# Patient Record
Sex: MALE | Race: WHITE
[De-identification: names, ages, dates, MRNs, and addresses within clinical notes are randomized per-mention and may not be internally consistent; named-entity substitution may affect disease eponyms.]

---

## 2018-05-15 ENCOUNTER — HOSPITAL ENCOUNTER (EMERGENCY)
Dept: HOSPITAL 50 - VM.ED | Age: 38
Discharge: HOME | End: 2018-05-15
Payer: COMMERCIAL

## 2018-05-15 VITALS — DIASTOLIC BLOOD PRESSURE: 88 MMHG | SYSTOLIC BLOOD PRESSURE: 147 MMHG

## 2018-05-15 DIAGNOSIS — N13.2: Primary | ICD-10-CM

## 2018-05-15 LAB
CHLORIDE SERPL-SCNC: 106 MMOL/L (ref 98–107)
SODIUM SERPL-SCNC: 144 MMOL/L (ref 136–145)

## 2018-05-15 NOTE — EDM.PDOC
ED HPI GENERAL MEDICAL PROBLEM





- General


Chief Complaint: Flank Pain


Stated Complaint: ER


Time Seen by Provider: 05/15/18 09:49


Source of Information: Reports: Patient


History Limitations: Reports: No Limitations





- History of Present Illness


INITIAL COMMENTS - FREE TEXT/NARRATIVE: 


Patient comes in this morning with complaints of left flank pain and nausea 

that started this morning and has progressively worsened.  He did have a 

similar episode last week, but the pain resolved after a bout of emesis.  He 

has no other complaints today.  He denies fever, chills, chest pain, diarrhea, 

blood in urine or stool.  He does have some left lateral abdominal pain, cva 

pain.  No medication allergies and is not currently taking any daily 

medications.


Onset: Today


Onset Date: 05/15/18


Location: Reports: Abdomen


Severity: Severe


Associated Symptoms: Reports: Diaphoresis, Nausea/Vomiting


  ** Left Flank


Pain Score (Numeric/FACES): 9





- Related Data


 Allergies











Allergy/AdvReac Type Severity Reaction Status Date / Time


 


No Known Allergies Allergy   Verified 05/15/18 09:48











Home Meds: 


 Home Meds





. [No Known Home Meds]  05/15/18 [History]











Past Medical History





- Past Health History


Medical/Surgical History: Denies Medical/Surgical History





ED ROS GENERAL





- Review of Systems


Review Of Systems: See Below


Constitutional: Reports: No Symptoms


HEENT: Reports: No Symptoms


Respiratory: Reports: No Symptoms


Cardiovascular: Reports: No Symptoms


Endocrine: Reports: No Symptoms


GI/Abdominal: Reports: Abdominal Pain, Nausea


: Reports: No Symptoms


Musculoskeletal: Reports: Back Pain


Skin: Reports: No Symptoms


Neurological: Reports: No Symptoms


Psychiatric: Reports: No Symptoms


Hematologic/Lymphatic: Reports: No Symptoms


Immunologic: Reports: No Symptoms





ED EXAM, RENAL/





- Physical Exam


Exam: See Below


Exam Limited By: No Limitations


General Appearance: Alert, WD/WN, Moderate Distress


Eye Exam: Bilateral Eye: EOMI, Normal Inspection, PERRL


Ears: Normal External Exam, Normal Canal, Hearing Grossly Normal, Normal TMs


Nose: Normal Inspection, Normal Mucosa, No Blood


Throat/Mouth: Normal Inspection, Normal Lips, Normal Teeth, Normal Gums, Normal 

Oropharynx, Normal Voice, No Airway Compromise


Head: Atraumatic, Normocephalic


Neck: Normal Inspection, Supple, Non-Tender, Full Range of Motion


Respiratory/Chest: No Respiratory Distress, Lungs Clear, Normal Breath Sounds, 

No Accessory Muscle Use, Chest Non-Tender


Cardiovascular: Normal Peripheral Pulses, Regular Rate, Rhythm, No Edema, No 

Gallop, No JVD, No Murmur, No Rub


GI/Abdominal: Normal Bowel Sounds, Soft, Non-Tender, No Organomegaly, No 

Distention, No Abnormal Bruit, No Mass


Back Exam: Normal Inspection, Full Range of Motion, CVA Tenderness (L)


Extremities: Normal Inspection, Normal Range of Motion, Non-Tender, Normal 

Capillary Refill, No Pedal Edema


Neurological: Alert, Oriented, CN II-XII Intact, Normal Cognition, Normal Gait, 

Normal Reflexes, No Motor/Sensory Deficits


Psychiatric: Normal Affect, Normal Mood


Skin Exam: Warm, Dry, Intact, Normal Color, No Rash


Lymphatic: No Adenopathy





Course





- Vital Signs


Last Recorded V/S: 


 Last Vital Signs











Temp  35.4 C   05/15/18 09:41


 


Pulse  70   05/15/18 10:50


 


Resp  16   05/15/18 10:50


 


BP  147/88 H  05/15/18 10:50


 


Pulse Ox  99   05/15/18 10:50














- Orders/Labs/Meds


Orders: 


 Active Orders 24 hr











 Category Date Time Status


 


 Abdomen Pelvis wo Cont [CT] Stat Exams  05/15/18 09:51 Ordered


 


 CULTURE URINE [RM] Stat Lab  05/15/18 09:50 Ordered


 


 UA W/MICROSCOPIC [URIN] Stat Lab  05/15/18 09:50 Ordered


 


 Saline Lock Insert [OM.PC] Routine Oth  05/15/18 09:50 Ordered











Labs: 


 Laboratory Tests











  05/15/18 05/15/18 05/15/18 Range/Units





  10:09 10:09 10:59 


 


WBC  10.7 H    (4.0-10.0)  x10^3/uL


 


RBC  5.12    (4.5-6.0)  x10^6/uL


 


Hgb  15.8    (14.0-18.0)  g/dL


 


Hct  43.8    (40.0-52.0)  %


 


MCV  85.5    (78.0-93.0)  fL


 


MCH  30.9    (26.0-32.0)  pg


 


MCHC  36.1 H    (32.0-36.0)  g/dL


 


RDW Coeff of Kaden  12.8    (10.0-15.0)  %


 


Plt Count  281    (130-400)  x10^3/uL


 


Neut % (Auto)  78.5    (50.0-80.0)  %


 


Lymph % (Auto)  15.4 L    (25.0-50.0)  %


 


Mono % (Auto)  4.9    (2.0-11.0)  %


 


Eos % (Auto)  1.1    (0.0-4.0)  %


 


Baso % (Auto)  0.1 L    (0.2-1.2)  %


 


Sodium   144   (136-145)  mmol/L


 


Potassium   3.7   (3.5-5.1)  mmol/L


 


Chloride   106   ()  mmol/L


 


Carbon Dioxide   23   (21-32)  mmol/L


 


Anion Gap   18.7   (10-20)  mmol/L


 


BUN   15   (7-18)  mg/dL


 


Creatinine   1.3   (0.70-1.30)  mg/dL


 


Est Cr Clr Drug Dosing   TNP   


 


Estimated GFR (MDRD)   > 60   


 


Glucose   159 H   ()  mg/dL


 


Calcium   9.2   (8.5-10.1)  mg/dL


 


Corrected Calcium   8.80   (8.5-10.1)  mg/dL


 


Total Bilirubin   1.0   (0.2-1.0)  mg/dL


 


AST   15   (15-37)  U/L


 


ALT   22   (16-63)  U/L


 


Alkaline Phosphatase   41 L   ()  U/L


 


Total Protein   7.6   (6.4-8.2)  g/dL


 


Albumin   4.5   (3.4-5.0)  g/dL


 


Globulin   3.1   


 


Albumin/Globulin Ratio   1.45   


 


Amylase   59   ()  U/L


 


Urine Color    Dark yellow H  (YELLOW)  


 


Urine Appearance    Turbid H  (CLEAR)  


 


Urine pH    7.0  (5.0-8.0)  


 


Ur Specific Gravity    1.020  


 


Urine Protein    30 H  (NEGATIVE)  mg/dL


 


Urine Glucose (UA)    Negative  (NEGATIVE)  mg/dL


 


Urine Ketones    40 H  (NEGATIVE)  mg/dL


 


Urine Occult Blood    Large H  (NEGATIVE)  


 


Urine Nitrite    Negative  (NEGATIVE)  


 


Urine Bilirubin    Negative  (NEGATIVE)  


 


Urine Urobilinogen    0.2  (0.2)  EU/dL


 


Ur Leukocyte Esterase    Negative  (NEGATIVE)  


 


Urine RBC    >100 H  (NOT SEEN)  /HPF


 


Urine Red Cell Clumps    Present  


 


Urine WBC    Not seen  (NOT SEEN)  /HPF


 


Ur Squamous Epith Cells    Not seen  (NEGATIVE)  /HPF


 


Amorphous Sediment    Moderate  


 


Urine Bacteria    Rare  (NEGATIVE)  /HPF


 


Hyaline Casts    Few H  (NEGATIVE)  /HPF


 


Granular Casts    Few H  (NEGATIVE)  /HPF


 


Urine Mucus    Many H  (NEGATIVE)  /LPF











Meds: 


Medications














Discontinued Medications














Generic Name Dose Route Start Last Admin





  Trade Name Edna  PRN Reason Stop Dose Admin


 


Ceftriaxone Sodium  1 gm  05/15/18 09:52  05/15/18 10:05





  Rocephin  IVPUSH  05/15/18 09:53  1 gm





  ONETIME ONE   Administration





     





     





     





     


 


Hydromorphone HCl  1 mg  05/15/18 09:50  05/15/18 10:02





  Dilaudid  IVPUSH  05/15/18 09:51  1 mg





  ONETIME ONE   Administration





     





     





     





     


 


Lactated Ringer's  1,000 mls @ 999 mls/hr  05/15/18 09:50  05/15/18 10:15





  Ringers, Lactated  IV  05/15/18 10:50  999 mls/hr





  .BOLUS ONE   Administration





     





     





     





     


 


Ketorolac Tromethamine  15 mg  05/15/18 10:10  05/15/18 10:23





  Toradol  IVPUSH  05/15/18 10:11  15 mg





  ONETIME ONE   Administration





     





     





     





     


 


Ondansetron HCl  4 mg  05/15/18 09:50  05/15/18 10:00





  Zofran  IVPUSH  05/15/18 09:51  4 mg





  ONETIME ONE   Administration





     





     





     





     


 


Sodium Chloride  10 ml  05/15/18 09:50  





  Saline Flush  FLUSH   





  ASDIRECTED PRN   





  Keep Vein Open   





     





     





     














- Re-Assessments/Exams


Free Text/Narrative Re-Assessment/Exam: 





05/15/18 11:42


Patient given 1 mg IV dilaudid, 15 mg IV toradol, 1 liter LR, 4 mg IV zofran, 

CT shows 5 x 2 mm left calculus.





Departure





- Departure


Time of Disposition: 11:10


Disposition: Home, Self-Care 01


Condition: Good


Clinical Impression: 


 Kidney stone on left side








- Discharge Information


Instructions:  Kidney Stones, Easy-to-Read, Flank Pain, Adult, Easy-to-Read


Referrals: 


Ryann Hays PA-C [Primary Care Provider] - 


Forms:  ED Department Discharge


Additional Instructions: 


Drink plenty of water





Reduce consumption of coffee and any sodas that you may drink





Take medications as prescribed: Flomax, Hydrocodone 10/325, over the counter 

aleve





Light duty at work





Follow up in the clinic with a likely referral to urology due to the size of 

the kidney stone.  It may not pass on its' own





Please call with any questions or concerns





- Problem List & Annotations


(1) Kidney stone on left side


SNOMED Code(s): 18450900


   Code(s): N20.0 - CALCULUS OF KIDNEY   Status: Acute   Priority: Low   

Current Visit: Yes   





- Problem List Review


Problem List Initiated/Reviewed/Updated: Yes





- My Orders


Last 24 Hours: 


My Active Orders





05/15/18 09:50


CULTURE URINE [RM] Stat 


UA W/MICROSCOPIC [URIN] Stat 


Saline Lock Insert [OM.PC] Routine 





05/15/18 09:51


Abdomen Pelvis wo Cont [CT] Stat 














- Assessment/Plan


Last 24 Hours: 


My Active Orders





05/15/18 09:50


CULTURE URINE [RM] Stat 


UA W/MICROSCOPIC [URIN] Stat 


Saline Lock Insert [OM.PC] Routine 





05/15/18 09:51


Abdomen Pelvis wo Cont [CT] Stat 











Assessment:: 





left sided kidney stone


Plan: 





Drink plenty of water





Reduce consumption of coffee and any sodas that you may drink





Take medications as prescribed: Flomax, Hydrocodone 10/325, over the counter 

aleve





Light duty at work





Follow up in the clinic with a likely referral to urology due to the size of 

the kidney stone.  It may not pass on its' own





Please call with any questions or concerns